# Patient Record
Sex: MALE | Race: WHITE | Employment: OTHER | URBAN - METROPOLITAN AREA
[De-identification: names, ages, dates, MRNs, and addresses within clinical notes are randomized per-mention and may not be internally consistent; named-entity substitution may affect disease eponyms.]

---

## 2022-04-04 ENCOUNTER — OFFICE VISIT (OUTPATIENT)
Dept: PODIATRY | Age: 77
End: 2022-04-04
Payer: MEDICARE

## 2022-04-04 VITALS — BODY MASS INDEX: 25.45 KG/M2 | RESPIRATION RATE: 18 BRPM | WEIGHT: 192 LBS | HEIGHT: 73 IN

## 2022-04-04 DIAGNOSIS — L60.0 INGROWN NAIL: ICD-10-CM

## 2022-04-04 DIAGNOSIS — B35.1 DERMATOPHYTOSIS OF NAIL: Primary | ICD-10-CM

## 2022-04-04 DIAGNOSIS — M79.604 PAIN IN BOTH LOWER EXTREMITIES: ICD-10-CM

## 2022-04-04 DIAGNOSIS — M79.605 PAIN IN BOTH LOWER EXTREMITIES: ICD-10-CM

## 2022-04-04 DIAGNOSIS — E11.51 TYPE II DIABETES MELLITUS WITH PERIPHERAL CIRCULATORY DISORDER (HCC): ICD-10-CM

## 2022-04-04 PROCEDURE — 11721 DEBRIDE NAIL 6 OR MORE: CPT | Performed by: PODIATRIST

## 2022-04-04 PROCEDURE — 99203 OFFICE O/P NEW LOW 30 MIN: CPT | Performed by: PODIATRIST

## 2022-04-04 RX ORDER — LIRAGLUTIDE 6 MG/ML
INJECTION SUBCUTANEOUS
COMMUNITY
Start: 2022-03-25

## 2022-04-04 RX ORDER — GLIMEPIRIDE 4 MG/1
TABLET ORAL
COMMUNITY
Start: 2022-02-02

## 2022-04-04 RX ORDER — SIMVASTATIN 20 MG
TABLET ORAL
COMMUNITY
Start: 2022-03-21

## 2022-04-04 RX ORDER — QUINAPRIL 40 MG/1
TABLET ORAL
COMMUNITY
Start: 2022-03-09

## 2022-04-04 RX ORDER — PEN NEEDLE, DIABETIC 30 GX 1/3"
NEEDLE, DISPOSABLE MISCELLANEOUS
COMMUNITY
Start: 2022-02-25

## 2022-04-04 RX ORDER — METOPROLOL SUCCINATE 50 MG/1
TABLET, EXTENDED RELEASE ORAL
COMMUNITY
Start: 2022-01-26

## 2022-04-04 RX ORDER — HYDROCHLOROTHIAZIDE 50 MG/1
TABLET ORAL
COMMUNITY
Start: 2022-03-01

## 2022-04-04 NOTE — PROGRESS NOTES
[address] [facility] PODIATRY [facility]  [address] [address] [place] [address]  Dept: [phone]  Dept Fax: [phone]    NEW PATIENT PROGRESS NOTE  Date of patient's visit: 4/4/2022  Patient's Name:  Tarik Shetty YOB: 1945            Patient Care Team:  Trell Davenport MD as PCP - General (Internal Medicine)  Nayana Bay DPM as Physician (Podiatry)        Chief Complaint   Patient presents with    New Patient    Diabetes    Peripheral Neuropathy    Nail Problem         HPI:   Tarik Shetty is a 76 y.o. male who presents to the office today complaining of needing a diabetic exam and routine footcare. Patient relates pain is minimal.  Pain is rated 1 out of 10 and is described as none. Treatments prior to today's visit include: none. Currently denies F/C/N/V. Pt's primary care physician is Trell Davenport MD last seen 08/13/2018     No Known Allergies    No past medical history on file. Prior to Admission medications    Medication Sig Start Date End Date Taking?  Authorizing Provider   glimepiride (AMARYL) 4 MG tablet TAKE 2 TABLETS BY MOUTH EVERY DAY 2/2/22  Yes Historical Provider, MD   hydroCHLOROthiazide (HYDRODIURIL) 50 MG tablet TAKE 1 TABLET BY MOUTH EVERY DAY IN THE MORNING 3/1/22  Yes Historical Provider, MD   NOVOTWIST PEN NEEDLE 32G X 5 MM MISC USE AS DIRECTED FOR 90 DAYS 2/25/22  Yes Historical Provider, MD   VICTOZA 18 MG/3ML SOPN SC injection ADMINISTER 1.8 MG UNDER THE SKIN DAILY SUBCUTANEOUSLY 3/25/22  Yes Historical Provider, MD   metFORMIN (GLUCOPHAGE) 1000 MG tablet TAKE 1 TABLET BY MOUTH WITH MEALS TWICE A DAY 3/1/22  Yes Historical Provider, MD   metoprolol succinate (TOPROL XL) 50 MG extended release tablet TAKE 1 TABLET BY MOUTH EVERY DAY 1/26/22  Yes Historical Provider, MD   quinapril (ACCUPRIL) 40 MG tablet TAKE 1 TABLET BY MOUTH EVERY DAY 3/9/22  Yes Historical Provider, MD   simvastatin (ZOCOR) 20 MG tablet TAKE 1 TABLET BY MOUTH EVERY DAY IN THE EVENING 3/21/22  Yes Historical Provider, MD       No past surgical history on file. No family history on file. Social History     Tobacco Use    Smoking status: Not on file    Smokeless tobacco: Not on file   Substance Use Topics    Alcohol use: Not on file       Review of Systems    Review of Systems:   History obtained from chart review and the patient  General ROS: negative for - chills, fatigue, fever, night sweats or weight gain  Constitutional: Negative for chills, diaphoresis, fatigue, fever and unexpected weight change. Musculoskeletal: Positive for arthralgias, gait problem and joint swelling. Neurological ROS: negative for - behavioral changes, confusion, headaches or seizures. Negative for weakness and numbness. Dermatological ROS: negative for - mole changes, rash  Cardiovascular: Negative for leg swelling. Gastrointestinal: Negative for constipation, diarrhea, nausea and vomiting. Lower Extremity Physical Examination:   Vitals:   Vitals:    04/04/22 0905   Resp: 18     General: AAO x 3 in NAD. Dermatologic Exam:    Dermatologic Exam:  Skin lesion/ulceration Absent . Skin No rashes or nodules noted. .   Skin is thin, with flaky sloughing skin as well as decreased hair growth to the lower leg  Small red hemosiderin deposits seen dorsal foot   Musculoskeletal:     1st MPJ ROM decreased, Bilateral.  Muscle strength 5/5, Bilateral.  Pain present upon palpation of toenails 1-5, Bilateral. decreased medial longitudinal arch, Bilateral.  Ankle ROM decreased,Bilateral.    Dorsally contracted digits present digits 2, Bilateral.     Vascular: DP pulses 1/4 bilateral.  PT pulses 0/4 bilateral.   CFT <5 seconds, Bilateral.  Hair growth absent to the level of the digits, Bilateral.  Edema present, Bilateral.  Varicosities absent, Bilateral. Erythema absent, Bilateral    Neurological: Sensation diminshed to light touch to level of digits, Bilateral.  Protective sensation intact 6/10 sites via 5.07/10g [place]-Jony Monofilament, Bilateral.  negative Tinel's, Bilateral.  negative Valleix sign, Bilateral.      Integument: Warm, dry, supple, Bilateral.  Open lesion absent, Bilateral.  Interdigital maceration absent to web spaces 4, Bilateral.  Nails 1-5 left and 1-5 right thickened > 3.0 mm, dystrophic and crumbly, discolored with yellow subungual debris. Fissures absent, Bilateral.       Asessment: Patient is a 76 y.o. male with:    Diagnosis Orders   1. Dermatophytosis of nail  HM DIABETES FOOT EXAM    48714 - [place] DEBRIDEMENT OF NAILS, 6 OR MORE   2. Ingrown nail  HM DIABETES FOOT EXAM    56408 - [place] DEBRIDEMENT OF NAILS, 6 OR MORE   3. Pain in both lower extremities  HM DIABETES FOOT EXAM    69598 - [place] DEBRIDEMENT OF NAILS, 6 OR MORE   4. Type II diabetes mellitus with peripheral circulatory disorder (HCC)  HM DIABETES FOOT EXAM    47059 - [place] DEBRIDEMENT OF NAILS, 6 OR MORE         Plan: Patient examined and evaluated. Current condition and treatment options discussed in detail. Discussed conservative and surgical options with the patient. Nails 1,2,3,4,5 Right and 1,2,3,4,5 Left were debrided and ground smooth with a dremmel. The patient tolerated the procedure well without apparent complications. All labs were reviewed and all imagining including the above findings were reviewed PRIOR to the patients arrival and with the patient today. Previous patient encounter was reviewed. Encounters from the patients other medical providers were reviewed and noted. Time was spent educating the patient and their families/caregivers on proper care of the feet and ankles. All the above diagnosis were addressed at todays visit and all questions were answered. A total of 30 minutes was spent with this patients encounter which included charting after the patients visit    Advised pt to monitor feet daily for open lesion.  Discussed importance of DM footcare. Verbal and written instructions given to patient. Contact office with any questions/problems/concerns. RTC in 3month(s).     4/4/2022    Electronically signed by Kitty Bar DPM on 4/4/2022 at 9:12 AM  4/4/2022

## 2022-06-27 ENCOUNTER — OFFICE VISIT (OUTPATIENT)
Dept: PODIATRY | Age: 77
End: 2022-06-27
Payer: MEDICARE

## 2022-06-27 VITALS — BODY MASS INDEX: 25.45 KG/M2 | WEIGHT: 192 LBS | HEIGHT: 73 IN

## 2022-06-27 DIAGNOSIS — E11.51 TYPE II DIABETES MELLITUS WITH PERIPHERAL CIRCULATORY DISORDER (HCC): ICD-10-CM

## 2022-06-27 DIAGNOSIS — B35.1 DERMATOPHYTOSIS OF NAIL: Primary | ICD-10-CM

## 2022-06-27 DIAGNOSIS — M79.604 PAIN IN BOTH LOWER EXTREMITIES: ICD-10-CM

## 2022-06-27 DIAGNOSIS — L60.0 INGROWN NAIL: ICD-10-CM

## 2022-06-27 DIAGNOSIS — M79.605 PAIN IN BOTH LOWER EXTREMITIES: ICD-10-CM

## 2022-06-27 PROCEDURE — 99999 PR OFFICE/OUTPT VISIT,PROCEDURE ONLY: CPT | Performed by: PODIATRIST

## 2022-06-27 PROCEDURE — 11721 DEBRIDE NAIL 6 OR MORE: CPT | Performed by: PODIATRIST

## 2022-06-27 RX ORDER — INSULIN GLARGINE 100 [IU]/ML
INJECTION, SOLUTION SUBCUTANEOUS NIGHTLY
COMMUNITY

## 2022-06-27 NOTE — PATIENT INSTRUCTIONS
Schedule a Vaccine  When you qualify to receive the vaccine, call the [facility]) COVID-19 Vaccination Hotline to schedule your appointment or to get additional information about the [facility]) locations which are offering the COVID-19 vaccine. To be 94% effective, it's important that you receive two doses of one of the COVID-19 vaccines. -If you are receiving the [employer] vaccine, your second shot will be scheduled as close to 21 days after the first shot as possible. -If you are receiving the Moderna vaccine, your second shot will be scheduled as close to 28 days after the first shot as possible. [facility]) COVID-19 Vaccination Hotline: [phone]    Links to [facility]) website and [facility] website:    [employer]/[email or web address]-monitoring-coronavirus-covid-19/covid-19-vaccine/[place]/[email or web address]    [email or web address]

## 2022-06-27 NOTE — PROGRESS NOTES
[address] [facility] PODIATRY [facility]  [address] [address] [address]  Dept: [phone]  Dept Fax: [phone]    DIABETIC PROGRESS NOTE  Date of patient's visit: 6/27/2022  Patient's Name:  Alondra Li YOB: 1945            Patient Care Team:  John Gaming MD as PCP - General (Internal Medicine)  Anne Arnold DPM as Physician (Podiatry)          Chief Complaint   Patient presents with    Diabetes     diabetic foot care    Peripheral Neuropathy     bilateral foot       Subjective: Alondra Li comes to clinic for Diabetes (diabetic foot care) and Peripheral Neuropathy (bilateral foot)    he is a diabetic and states that needs toenails trimmed today. Pt currently has complaint of thickened, elongated nails that they cannot manage by themselves. Pt's primary care physician is John Gaming MD last seen 6/6/22   Pt's last blood sugar was 118- this morning. No results found for: LABA1C   Complains of numbness in the feet bilat. History reviewed. No pertinent past medical history.     No Known Allergies  Current Outpatient Medications on File Prior to Visit   Medication Sig Dispense Refill    insulin glargine (BASAGLAR KWIKPEN) 100 UNIT/ML injection pen Inject into the skin nightly      glimepiride (AMARYL) 4 MG tablet TAKE 2 TABLETS BY MOUTH EVERY DAY      hydroCHLOROthiazide (HYDRODIURIL) 50 MG tablet TAKE 1 TABLET BY MOUTH EVERY DAY IN THE MORNING      NOVOTWIST PEN NEEDLE 32G X 5 MM MISC USE AS DIRECTED FOR 90 DAYS      VICTOZA 18 MG/3ML SOPN SC injection ADMINISTER 1.8 MG UNDER THE SKIN DAILY SUBCUTANEOUSLY      metFORMIN (GLUCOPHAGE) 1000 MG tablet TAKE 1 TABLET BY MOUTH WITH MEALS TWICE A DAY      metoprolol succinate (TOPROL XL) 50 MG extended release tablet TAKE 1 TABLET BY MOUTH EVERY DAY      quinapril (ACCUPRIL) 40 MG tablet TAKE 1 TABLET BY MOUTH EVERY DAY      simvastatin (ZOCOR) 20 MG tablet TAKE 1 TABLET BY MOUTH EVERY DAY IN THE EVENING       No current facility-administered medications on file prior to visit. Review of Systems    Review of Systems:   History obtained from chart review and the patient  General ROS: negative for - chills, fatigue, fever, night sweats or weight gain  Constitutional: Negative for chills, diaphoresis, fatigue, fever and unexpected weight change. Musculoskeletal: Positive for arthralgias, gait problem and joint swelling. Neurological ROS: negative for - behavioral changes, confusion, headaches or seizures. Negative for weakness and numbness. Dermatological ROS: negative for - mole changes, rash  Cardiovascular: Negative for leg swelling. Gastrointestinal: Negative for constipation, diarrhea, nausea and vomiting. Objective:  Dermatologic Exam:  Skin lesion/ulceration Absent . Skin No rashes or nodules noted. .   Skin is thin, with flaky sloughing skin as well as decreased hair growth to the lower leg  Small red hemosiderin deposits seen dorsal foot   Musculoskeletal:     1st MPJ ROM decreased, Bilateral.  Muscle strength 5/5, Bilateral.  Pain present upon palpation of toenails 1-5, Bilateral. decreased medial longitudinal arch, Bilateral.  Ankle ROM decreased,Bilateral.    Dorsally contracted digits present digits 2, Bilateral.     Vascular: DP pulses 1/4 bilateral.  PT pulses 0/4 bilateral.   CFT <5 seconds, Bilateral.  Hair growth absent to the level of the digits, Bilateral.  Edema present, Bilateral.  Varicosities absent, Bilateral. Erythema absent, Bilateral    Neurological: Sensation diminshed to light touch to level of digits, Bilateral.  Protective sensation intact 6/10 sites via 5.07/10g [place]-Jony Monofilament, Bilateral.  negative Tinel's, Bilateral.  negative Valleix sign, Bilateral.      Integument: Warm, dry, supple, Bilateral.  Open lesion absent, Bilateral.  Interdigital maceration absent to web spaces 4, Bilateral.  Nails 1-5 left and 1-5 right thickened > 3.0 mm, dystrophic and crumbly, discolored with subungual debris. Fissures absent, Bilateral.   General: AAO x 3 in NAD. Derm  Toenail Description  Sites of Onychomycosis Involvement (Check affected area)  [x] [x] [x] [x] [x] [x] [x] [x] [x] [x]  5 4 3 2 1 1 2 3 4 5                          Right                                        Left    Thickness  [x] [x] [x] [x] [x] [x] [x] [x] [x] [x]  5 4 3 2 1 1 2 3 4 5                         Right                                        Left    Dystrophic Changes   [x] [x] [x] [x] [x] [x] [x] [x] [x] [x]  5 4 3 2 1 1 2 3 4 5                         Right                                        Left    Color   [x] [x] [x] [x] [x] [x] [x] [x] [x] [x]  5 4 3 2 1 1 2 3 4 5                          Right                                        Left    Incurvation/Ingrowin   [] [] [] [] [] [] [] [] [] []  5 4 3 2 1 1 2 3 4 5                         Right                                        Left    Inflammation/Pain   [x] [x] [x] [x] [x] [x] [x] [x] [x] [x]  5 4 3 2 1 1 2 3 4 5                         Right                                        Left        Visual inspection:  Deformity: hammertoe deformity faviola feet  amputation: absent  Skin lesions: absent  Edema: right- 2+ pitting edema, left- 2+ pitting edema    Sensory exam:  Monofilament sensation: abnormal - 6/10 via SW 5.07/10g monofilament to the plantar foot bilateral feet    Pulses: abnormal - 1/4 dorsalis pedis pulse and 0/4 Posterior tibial pulse,   Pinprick: Impaired  Proprioception: Impaired  Vibration (128 Hz): Impaired       DM with PVD       [x]Yes    []No      Assessment:  76 y.o. male with:   Diagnosis Orders   1. Dermatophytosis of nail  74102 - [place] DEBRIDEMENT OF NAILS, 6 OR MORE    HM DIABETES FOOT EXAM   2. Ingrown nail  77087 - [place] DEBRIDEMENT OF NAILS, 6 OR MORE    HM DIABETES FOOT EXAM   3.  Pain in both lower extremities  27340 - [place] DEBRIDEMENT OF NAILS, 6 OR MORE  DIABETES FOOT EXAM   4. Type II diabetes mellitus with peripheral circulatory disorder ([facility])  76247 - [place] DEBRIDEMENT OF NAILS, 6 OR MORE     DIABETES FOOT EXAM       Q7   []Yes    []No                Q8   [x]Yes    []No                     Q9   []Yes    []No    Plan:   Pt was evaluated and examined. Patient was given personalized discharge instructions. Nails 1-10 were debrided sharply in length and thickness with a nipper and , without incident. Pt will follow up in 9 weeks or sooner if any problems arise. Diagnosis was discussed with the pt and all of their questions were answered in detail. Proper foot hygiene and care was discussed with the pt. Informed patient on proper diabetic foot care and importance of tight glycemic control. Patient to check feet daily and contact the office with any questions/problems/concerns.    Other comorbidity noted and will be managed by PCP.  6/27/2022    Electronically signed by Amy Gallegos DPM on 6/27/2022 at 9:03 AM  6/27/2022

## 2022-09-28 ENCOUNTER — OFFICE VISIT (OUTPATIENT)
Dept: PODIATRY | Age: 77
End: 2022-09-28
Payer: MEDICARE

## 2022-09-28 VITALS — HEIGHT: 73 IN | WEIGHT: 192 LBS | BODY MASS INDEX: 25.45 KG/M2

## 2022-09-28 DIAGNOSIS — M79.604 PAIN IN BOTH LOWER EXTREMITIES: ICD-10-CM

## 2022-09-28 DIAGNOSIS — B35.1 DERMATOPHYTOSIS OF NAIL: ICD-10-CM

## 2022-09-28 DIAGNOSIS — E11.51 TYPE II DIABETES MELLITUS WITH PERIPHERAL CIRCULATORY DISORDER (HCC): Primary | ICD-10-CM

## 2022-09-28 DIAGNOSIS — M79.605 PAIN IN BOTH LOWER EXTREMITIES: ICD-10-CM

## 2022-09-28 DIAGNOSIS — L60.0 INGROWN NAIL: ICD-10-CM

## 2022-09-28 PROCEDURE — 11721 DEBRIDE NAIL 6 OR MORE: CPT | Performed by: PODIATRIST

## 2022-09-28 PROCEDURE — 99999 PR OFFICE/OUTPT VISIT,PROCEDURE ONLY: CPT | Performed by: PODIATRIST

## 2022-09-28 NOTE — PROGRESS NOTES
[address] [facility] PODIATRY [facility]  [address] [address] [address]  Dept: [phone]  Dept Fax: [phone]    DIABETIC PROGRESS NOTE  Date of patient's visit: 9/28/2022  Patient's Name:  Den Suazo YOB: 1945            Patient Care Team:  Maciel Hernandez MD as PCP - General (Internal Medicine)  Janice Simeon DPM as Physician (Podiatry)          Chief Complaint   Patient presents with    Diabetes     Diabetic foot care    Peripheral Neuropathy     Bilateral feet       Subjective: Den Suaoz comes to clinic for Diabetes (Diabetic foot care) and Peripheral Neuropathy (Bilateral feet)    he is a diabetic and states that he is doing well. Pt currently has complaint of thickened, elongated nails that they cannot manage by themselves. Pt's primary care physician is Maciel Hernandez MD last seen 06/06/2022   Pt's last blood sugar was 150 this morning. No results found for: LABA1C   Complains of numbness in the feet bilat. No past medical history on file.     No Known Allergies  Current Outpatient Medications on File Prior to Visit   Medication Sig Dispense Refill    insulin glargine (BASAGLAR KWIKPEN) 100 UNIT/ML injection pen Inject into the skin nightly      glimepiride (AMARYL) 4 MG tablet TAKE 2 TABLETS BY MOUTH EVERY DAY      hydroCHLOROthiazide (HYDRODIURIL) 50 MG tablet TAKE 1 TABLET BY MOUTH EVERY DAY IN THE MORNING      NOVOTWIST PEN NEEDLE 32G X 5 MM MISC USE AS DIRECTED FOR 90 DAYS      VICTOZA 18 MG/3ML SOPN SC injection ADMINISTER 1.8 MG UNDER THE SKIN DAILY SUBCUTANEOUSLY      metFORMIN (GLUCOPHAGE) 1000 MG tablet TAKE 1 TABLET BY MOUTH WITH MEALS TWICE A DAY      metoprolol succinate (TOPROL XL) 50 MG extended release tablet TAKE 1 TABLET BY MOUTH EVERY DAY      quinapril (ACCUPRIL) 40 MG tablet TAKE 1 TABLET BY MOUTH EVERY DAY      simvastatin (ZOCOR) 20 MG tablet TAKE 1 TABLET BY MOUTH EVERY DAY IN THE EVENING       No current facility-administered medications on file prior to visit. Review of Systems    Review of Systems:   History obtained from chart review and the patient  General ROS: negative for - chills, fatigue, fever, night sweats or weight gain  Constitutional: Negative for chills, diaphoresis, fatigue, fever and unexpected weight change. Musculoskeletal: Positive for arthralgias, gait problem and joint swelling. Neurological ROS: negative for - behavioral changes, confusion, headaches or seizures. Negative for weakness and numbness. Dermatological ROS: negative for - mole changes, rash  Cardiovascular: Negative for leg swelling. Gastrointestinal: Negative for constipation, diarrhea, nausea and vomiting. Objective:  Dermatologic Exam:  Skin lesion/ulceration Absent . Skin No rashes or nodules noted. .   Skin is thin, with flaky sloughing skin as well as decreased hair growth to the lower leg  Small red hemosiderin deposits seen dorsal foot   Musculoskeletal:     1st MPJ ROM decreased, Bilateral.  Muscle strength 5/5, Bilateral.  Pain present upon palpation of toenails 1-5, Bilateral. decreased medial longitudinal arch, Bilateral.  Ankle ROM decreased,Bilateral.    Dorsally contracted digits present digits 2, Bilateral.     Vascular: DP pulses 1/4 bilateral.  PT pulses 0/4 bilateral.   CFT <5 seconds, Bilateral.  Hair growth absent to the level of the digits, Bilateral.  Edema present, Bilateral.  Varicosities absent, Bilateral. Erythema absent, Bilateral    Neurological: Sensation diminshed to light touch to level of digits, Bilateral.  Protective sensation intact 6/10 sites via 5.07/10g [place]-Jony Monofilament, Bilateral.  negative Tinel's, Bilateral.  negative Valleix sign, Bilateral.      Integument: Warm, dry, supple, Bilateral.  Open lesion absent, Bilateral.  Interdigital maceration absent to web spaces 4, Bilateral.  Nails 1-5 left and 1-5 right thickened > 3.0 mm, dystrophic and crumbly, discolored with subungual debris. Fissures absent, Bilateral.   General: AAO x 3 in NAD. Derm  Toenail Description  Sites of Onychomycosis Involvement (Check affected area)  [x] [x] [x] [x] [x] [x] [x] [x] [x] [x]  5 4 3 2 1 1 2 3 4 5                          Right                                        Left    Thickness  [x] [x] [x] [x] [x] [x] [x] [x] [x] [x]  5 4 3 2 1 1 2 3 4 5                         Right                                        Left    Dystrophic Changes   [x] [x] [x] [x] [x] [x] [x] [x] [x] [x]  5 4 3 2 1 1 2 3 4 5                         Right                                        Left    Color   [x] [x] [x] [x] [x] [x] [x] [x] [x] [x]  5 4 3 2 1 1 2 3 4 5                          Right                                        Left    Incurvation/Ingrowin   [] [] [] [] [] [] [] [] [] []  5 4 3 2 1 1 2 3 4 5                         Right                                        Left    Inflammation/Pain   [x] [x] [x] [x] [x] [x] [x] [x] [x] [x]  5 4 3 2 1 1 2 3 4 5                         Right                                        Left        Visual inspection:  Deformity: hammertoe deformity faviola feet  amputation: absent  Skin lesions: absent  Edema: right- 2+ pitting edema, left- 2+ pitting edema    Sensory exam:  Monofilament sensation: abnormal - 6/10 via SW 5.07/10g monofilament to the plantar foot bilateral feet    Pulses: abnormal - 1/4 dorsalis pedis pulse and 0/4 Posterior tibial pulse,   Pinprick: Impaired  Proprioception: Impaired  Vibration (128 Hz): Impaired       DM with PVD       [x]Yes    []No      Assessment:  76 y.o. male with:   Diagnosis Orders   1. Type II diabetes mellitus with peripheral circulatory disorder (HCC)  HM DIABETES FOOT EXAM    31292 - [place] DEBRIDEMENT OF NAILS, 6 OR MORE      2. Dermatophytosis of nail  HM DIABETES FOOT EXAM    35693 - [place] DEBRIDEMENT OF NAILS, 6 OR MORE      3.  Ingrown nail  HM DIABETES FOOT EXAM    56986 - [place]

## 2022-11-30 ENCOUNTER — OFFICE VISIT (OUTPATIENT)
Dept: PODIATRY | Age: 77
End: 2022-11-30

## 2022-11-30 VITALS — WEIGHT: 192 LBS | BODY MASS INDEX: 25.45 KG/M2 | HEIGHT: 73 IN

## 2022-11-30 DIAGNOSIS — M79.604 PAIN IN BOTH LOWER EXTREMITIES: ICD-10-CM

## 2022-11-30 DIAGNOSIS — E11.51 TYPE II DIABETES MELLITUS WITH PERIPHERAL CIRCULATORY DISORDER (HCC): Primary | ICD-10-CM

## 2022-11-30 DIAGNOSIS — B35.1 DERMATOPHYTOSIS OF NAIL: ICD-10-CM

## 2022-11-30 DIAGNOSIS — L60.0 INGROWN NAIL: ICD-10-CM

## 2022-11-30 DIAGNOSIS — M79.605 PAIN IN BOTH LOWER EXTREMITIES: ICD-10-CM

## 2022-11-30 NOTE — PATIENT INSTRUCTIONS
Schedule a Vaccine  When you qualify to receive the vaccine, call the [facility]) COVID-19 Vaccination Hotline to schedule your appointment or to get additional information about the [facility]) locations which are offering the COVID-19 vaccine. To be 94% effective, it's important that you receive two doses of one of the COVID-19 vaccines. -If you are receiving the [employer] vaccine, your second shot will be scheduled as close to 21 days after the first shot as possible. -If you are receiving the Moderna vaccine, your second shot will be scheduled as close to 28 days after the first shot as possible. [facility]) COVID-19 Vaccination Hotline: [phone]    Links to [facility]) website and [facility] website:    [employer]/[facility]-monitoring-coronavirus-covid-19/covid-19-vaccine/[place]/[email or web address]    [email or web address]

## 2022-11-30 NOTE — PROGRESS NOTES
[address]  [address] [facility] [address]  Dept: [phone]  Dept Fax: [phone]    DIABETIC PROGRESS NOTE  Date of patient's visit: 11/30/2022  Patient's Name:  Sanaz Monroy YOB: 1945            Patient Care Team:  Dg Dailey MD as PCP - General (Internal Medicine)  Karen Kimbrough DPM as Physician (Podiatry)          Chief Complaint   Patient presents with    Diabetes     Diabetic foot care    Peripheral Neuropathy     Bilateral foot       Subjective: Sanaz Monroy comes to clinic for Diabetes (Diabetic foot care) and Peripheral Neuropathy (Bilateral foot)    he is a diabetic and states that needs toenails trimmed today. Pt currently has complaint of thickened, elongated nails that they cannot manage by themselves. Pt's primary care physician is Dg Dailey MD last seen 6/6/22   Pt's last blood sugar was patient states did not check today. No results found for: LABA1C   Complains of numbness in the feet bilat. No past medical history on file.     No Known Allergies  Current Outpatient Medications on File Prior to Visit   Medication Sig Dispense Refill    insulin glargine (BASAGLAR KWIKPEN) 100 UNIT/ML injection pen Inject into the skin nightly      glimepiride (AMARYL) 4 MG tablet TAKE 2 TABLETS BY MOUTH EVERY DAY      hydroCHLOROthiazide (HYDRODIURIL) 50 MG tablet TAKE 1 TABLET BY MOUTH EVERY DAY IN THE MORNING      NOVOTWIST PEN NEEDLE 32G X 5 MM MISC USE AS DIRECTED FOR 90 DAYS      VICTOZA 18 MG/3ML SOPN SC injection ADMINISTER 1.8 MG UNDER THE SKIN DAILY SUBCUTANEOUSLY      metFORMIN (GLUCOPHAGE) 1000 MG tablet TAKE 1 TABLET BY MOUTH WITH MEALS TWICE A DAY      metoprolol succinate (TOPROL XL) 50 MG extended release tablet TAKE 1 TABLET BY MOUTH EVERY DAY      quinapril (ACCUPRIL) 40 MG tablet TAKE 1 TABLET BY MOUTH EVERY DAY      simvastatin (ZOCOR) 20 MG tablet TAKE 1 TABLET BY MOUTH EVERY DAY IN THE EVENING       No current facility-administered medications on file prior to visit. Review of Systems    Review of Systems:   History obtained from chart review and the patient  General ROS: negative for - chills, fatigue, fever, night sweats or weight gain  Constitutional: Negative for chills, diaphoresis, fatigue, fever and unexpected weight change. Musculoskeletal: Positive for arthralgias, gait problem and joint swelling. Neurological ROS: negative for - behavioral changes, confusion, headaches or seizures. Negative for weakness and numbness. Dermatological ROS: negative for - mole changes, rash  Cardiovascular: Negative for leg swelling. Gastrointestinal: Negative for constipation, diarrhea, nausea and vomiting. Objective:  Dermatologic Exam:  Skin lesion/ulceration Absent . Skin No rashes or nodules noted. .   Skin is thin, with flaky sloughing skin as well as decreased hair growth to the lower leg  Small red hemosiderin deposits seen dorsal foot   Musculoskeletal:     1st MPJ ROM decreased, Bilateral.  Muscle strength 5/5, Bilateral.  Pain present upon palpation of toenails 1-5, Bilateral. decreased medial longitudinal arch, Bilateral.  Ankle ROM decreased,Bilateral.    Dorsally contracted digits present digits 2, Bilateral.     Vascular: DP pulses 1/4 bilateral.  PT pulses 0/4 bilateral.   CFT <5 seconds, Bilateral.  Hair growth absent to the level of the digits, Bilateral.  Edema present, Bilateral.  Varicosities absent, Bilateral. Erythema absent, Bilateral    Neurological: Sensation diminshed to light touch to level of digits, Bilateral.  Protective sensation intact 6/10 sites via 5.07/10g [place]ony Monofilament, Bilateral.  negative Tinel's, Bilateral.  negative Valleix sign, Bilateral.      Integument: Warm, dry, supple, Bilateral.  Open lesion absent, Bilateral.  Interdigital maceration absent to web spaces 4, Bilateral.  Nails 1-5 left and 1-5 right thickened > 3.0 mm, dystrophic and crumbly, discolored with subungual debris. Fissures absent, Bilateral.   General: AAO x 3 in NAD. Derm  Toenail Description  Sites of Onychomycosis Involvement (Check affected area)  [x] [x] [x] [x] [x] [x] [x] [x] [x] [x]  5 4 3 2 1 1 2 3 4 5                          Right                                        Left    Thickness  [x] [x] [x] [x] [x] [x] [x] [x] [x] [x]  5 4 3 2 1 1 2 3 4 5                         Right                                        Left    Dystrophic Changes   [x] [x] [x] [x] [x] [x] [x] [x] [x] [x]  5 4 3 2 1 1 2 3 4 5                         Right                                        Left    Color   [x] [x] [x] [x] [x] [x] [x] [x] [x] [x]  5 4 3 2 1 1 2 3 4 5                          Right                                        Left    Incurvation/Ingrowin   [] [] [] [] [] [] [] [] [] []  5 4 3 2 1 1 2 3 4 5                         Right                                        Left    Inflammation/Pain   [x] [x] [x] [x] [x] [x] [x] [x] [x] [x]  5 4 3 2 1 1 2 3 4 5                         Right                                        Left        Visual inspection:  Deformity: hammertoe deformity faviola feet  amputation: absent  Skin lesions: absent  Edema: right- 2+ pitting edema, left- 2+ pitting edema    Sensory exam:  Monofilament sensation: abnormal - 6/10 via SW 5.07/10g monofilament to the plantar foot bilateral feet    Pulses: abnormal - 1/4 dorsalis pedis pulse and 0/4 Posterior tibial pulse,   Pinprick: Impaired  Proprioception: Impaired  Vibration (128 Hz): Impaired       DM with PVD       [x]Yes    []No      Assessment:  76 y.o. male with:   Diagnosis Orders   1. Type II diabetes mellitus with peripheral circulatory disorder (HCC)  89529 - AL DEBRIDEMENT OF NAILS, 6 OR MORE    HM DIABETES FOOT EXAM      2. Dermatophytosis of nail  24113 - AL DEBRIDEMENT OF NAILS, 6 OR MORE    HM DIABETES FOOT EXAM      3.  Ingrown nail  71454 - AL DEBRIDEMENT OF NAILS, 6 OR MORE     DIABETES FOOT EXAM      4. Pain in both lower extremities  46899 - NV DEBRIDEMENT OF NAILS, 6 OR MORE     DIABETES FOOT EXAM            Q7   []Yes    []No                Q8   [x]Yes    []No                     Q9   []Yes    []No    Plan:   Pt was evaluated and examined. Patient was given personalized discharge instructions. Nails 1-10 were debrided sharply in length and thickness with a nipper and , without incident. Pt will follow up in 9 weeks or sooner if any problems arise. Diagnosis was discussed with the pt and all of their questions were answered in detail. Proper foot hygiene and care was discussed with the pt. Informed patient on proper diabetic foot care and importance of tight glycemic control. Patient to check feet daily and contact the office with any questions/problems/concerns. Other comorbidity noted and will be managed by PCP.   11/30/2022    Electronically signed by Yovany Nichole DPM on 11/30/2022 at 9:47 AM  11/30/2022

## 2023-03-22 ENCOUNTER — OFFICE VISIT (OUTPATIENT)
Dept: PODIATRY | Age: 78
End: 2023-03-22
Payer: MEDICARE

## 2023-03-22 VITALS — WEIGHT: 192 LBS | BODY MASS INDEX: 25.45 KG/M2 | HEIGHT: 73 IN

## 2023-03-22 DIAGNOSIS — B35.1 DERMATOPHYTOSIS OF NAIL: ICD-10-CM

## 2023-03-22 DIAGNOSIS — E11.51 TYPE II DIABETES MELLITUS WITH PERIPHERAL CIRCULATORY DISORDER (HCC): Primary | ICD-10-CM

## 2023-03-22 DIAGNOSIS — M79.605 PAIN IN BOTH LOWER EXTREMITIES: ICD-10-CM

## 2023-03-22 DIAGNOSIS — M79.604 PAIN IN BOTH LOWER EXTREMITIES: ICD-10-CM

## 2023-03-22 DIAGNOSIS — L60.0 INGROWN NAIL: ICD-10-CM

## 2023-03-22 PROCEDURE — 99999 PR OFFICE/OUTPT VISIT,PROCEDURE ONLY: CPT | Performed by: PODIATRIST

## 2023-03-22 PROCEDURE — 11721 DEBRIDE NAIL 6 OR MORE: CPT | Performed by: PODIATRIST

## 2023-03-22 RX ORDER — DAPAGLIFLOZIN 10 MG/1
TABLET, FILM COATED ORAL
COMMUNITY
Start: 2023-03-20

## 2023-03-22 RX ORDER — DORZOLAMIDE HYDROCHLORIDE AND TIMOLOL MALEATE 20; 5 MG/ML; MG/ML
SOLUTION/ DROPS OPHTHALMIC
COMMUNITY
Start: 2023-01-27

## 2023-03-22 NOTE — PROGRESS NOTES
[address] [facility],[address] PODIATRY [facility]  [address]  [address]  Dept: [phone]  Dept Fax: [phone]    DIABETIC PROGRESS NOTE  Date of patient's visit: 3/22/2023  Patient's Name:  Bladimir Chow YOB: 1945            Patient Care Team:  Jim Looney MD as PCP - General (Internal Medicine)  Mayr Oliver DPM as Physician (Podiatry)          Chief Complaint   Patient presents with    Diabetes     Diabetic foot care    Peripheral Neuropathy     Bilateral feet       Subjective: Bladimir Chow comes to clinic for Diabetes (Diabetic foot care) and Peripheral Neuropathy (Bilateral feet)    he is a diabetic and states that he is doing well. Pt currently has complaint of thickened, elongated nails that they cannot manage by themselves. Pt's primary care physician is Jim Looney MD last seen 02/25/2023   Pt's last blood sugar was 160 . Complains of numbness in the feet bilat. No past medical history on file.     No Known Allergies  Current Outpatient Medications on File Prior to Visit   Medication Sig Dispense Refill    FARXIGA 10 MG tablet       dorzolamide-timolol (COSOPT) 22.3-6.8 MG/ML ophthalmic solution INSTILL 1 DROP INTO LEFT EYE TWICE A DAY      insulin glargine (BASAGLAR KWIKPEN) 100 UNIT/ML injection pen Inject into the skin nightly      glimepiride (AMARYL) 4 MG tablet TAKE 2 TABLETS BY MOUTH EVERY DAY      hydroCHLOROthiazide (HYDRODIURIL) 50 MG tablet TAKE 1 TABLET BY MOUTH EVERY DAY IN THE MORNING      NOVOTWIST PEN NEEDLE 32G X 5 MM MISC USE AS DIRECTED FOR 90 DAYS      VICTOZA 18 MG/3ML SOPN SC injection ADMINISTER 1.8 MG UNDER THE SKIN DAILY SUBCUTANEOUSLY      metFORMIN (GLUCOPHAGE) 1000 MG tablet TAKE 1 TABLET BY MOUTH WITH MEALS TWICE A DAY      metoprolol succinate (TOPROL XL) 50 MG extended release tablet TAKE 1 TABLET BY MOUTH EVERY DAY      quinapril (ACCUPRIL) 40 MG tablet TAKE 1

## 2023-05-24 ENCOUNTER — OFFICE VISIT (OUTPATIENT)
Dept: PODIATRY | Age: 78
End: 2023-05-24
Payer: MEDICARE

## 2023-05-24 VITALS — WEIGHT: 195 LBS | BODY MASS INDEX: 25.84 KG/M2 | HEIGHT: 73 IN

## 2023-05-24 DIAGNOSIS — M79.604 PAIN IN BOTH LOWER EXTREMITIES: ICD-10-CM

## 2023-05-24 DIAGNOSIS — M79.605 PAIN IN BOTH LOWER EXTREMITIES: ICD-10-CM

## 2023-05-24 DIAGNOSIS — E11.51 TYPE II DIABETES MELLITUS WITH PERIPHERAL CIRCULATORY DISORDER (HCC): Primary | ICD-10-CM

## 2023-05-24 DIAGNOSIS — L60.0 INGROWN NAIL: ICD-10-CM

## 2023-05-24 DIAGNOSIS — B35.1 DERMATOPHYTOSIS OF NAIL: ICD-10-CM

## 2023-05-24 PROCEDURE — 11721 DEBRIDE NAIL 6 OR MORE: CPT | Performed by: PODIATRIST

## 2023-05-24 PROCEDURE — 99999 PR OFFICE/OUTPT VISIT,PROCEDURE ONLY: CPT | Performed by: PODIATRIST

## 2023-05-24 RX ORDER — LATANOPROST 50 UG/ML
SOLUTION/ DROPS OPHTHALMIC
COMMUNITY
Start: 2023-05-12

## 2023-05-24 RX ORDER — SILICONE ADHESIVE 1.5" X 3"
1 SHEET (EA) TOPICAL EVERY 4 HOURS PRN
COMMUNITY
Start: 2023-04-11 | End: 2024-04-10

## 2023-05-24 RX ORDER — LATANOPROST 50 UG/ML
1 SOLUTION/ DROPS OPHTHALMIC NIGHTLY
COMMUNITY
Start: 2023-05-12

## 2023-05-24 RX ORDER — BRIMONIDINE TARTRATE 2 MG/ML
SOLUTION/ DROPS OPHTHALMIC
COMMUNITY
Start: 2023-05-22

## 2023-05-24 RX ORDER — LISINOPRIL 40 MG/1
40 TABLET ORAL DAILY
COMMUNITY
Start: 2023-03-14

## 2023-05-30 NOTE — PROGRESS NOTES
[facility],[address] PODIATRY [facility]  [address]  [address]  Dept: [phone]  Dept Fax: [phone]    DIABETIC PROGRESS NOTE  Date of patient's visit: 5/30/2023  Patient's Name:  Junaid James YOB: 1945            Patient Care Team:  Tabby Joseph MD as PCP - General (Internal Medicine)  Mer Campoverde DPM as Physician (Podiatry)          Chief Complaint   Patient presents with    Diabetes     Diabetic foot care   Bs 160    Peripheral Neuropathy     Bl feet        Subjective: Junaid James comes to clinic for Diabetes (Diabetic foot care /Bs 160) and Peripheral Neuropathy (Bl feet )    he is a diabetic and states that he is doing well. Pt currently has complaint of thickened, elongated nails that they cannot manage by themselves. Pt's primary care physician is Tabby Joseph MD last seen 02/25/2023   Pt's last blood sugar was 160 . Complains of numbness in the feet bilat. History reviewed. No pertinent past medical history.     No Known Allergies  Current Outpatient Medications on File Prior to Visit   Medication Sig Dispense Refill    brimonidine (ALPHAGAN) 0.2 % ophthalmic solution       latanoprost (XALATAN) 0.005 % ophthalmic solution ADMINISTER 1 DROP TO BOTH EYES NIGHTLY.      latanoprost (XALATAN) 0.005 % ophthalmic solution Apply 1 drop to eye nightly      lisinopril (PRINIVIL;ZESTRIL) 40 MG tablet Take 1 tablet by mouth daily      sodium chloride (GRAYSON 128) 5 % ophthalmic solution Apply 1 drop to eye every 4 hours as needed      FARXIGA 10 MG tablet       dorzolamide-timolol (COSOPT) 22.3-6.8 MG/ML ophthalmic solution INSTILL 1 DROP INTO LEFT EYE TWICE A DAY      insulin glargine (BASAGLAR KWIKPEN) 100 UNIT/ML injection pen Inject into the skin nightly      glimepiride (AMARYL) 4 MG tablet TAKE 2 TABLETS BY MOUTH EVERY DAY      hydroCHLOROthiazide (HYDRODIURIL) 50 MG tablet TAKE 1 TABLET BY

## 2023-08-02 ENCOUNTER — OFFICE VISIT (OUTPATIENT)
Dept: PODIATRY | Age: 78
End: 2023-08-02
Payer: MEDICARE

## 2023-08-02 VITALS — HEIGHT: 73 IN | BODY MASS INDEX: 25.84 KG/M2 | WEIGHT: 195 LBS

## 2023-08-02 DIAGNOSIS — B35.1 DERMATOPHYTOSIS OF NAIL: ICD-10-CM

## 2023-08-02 DIAGNOSIS — M79.605 PAIN IN BOTH LOWER EXTREMITIES: ICD-10-CM

## 2023-08-02 DIAGNOSIS — E11.51 TYPE II DIABETES MELLITUS WITH PERIPHERAL CIRCULATORY DISORDER (HCC): Primary | ICD-10-CM

## 2023-08-02 DIAGNOSIS — L60.0 INGROWN NAIL: ICD-10-CM

## 2023-08-02 DIAGNOSIS — M79.604 PAIN IN BOTH LOWER EXTREMITIES: ICD-10-CM

## 2023-08-02 PROCEDURE — 99999 PR OFFICE/OUTPT VISIT,PROCEDURE ONLY: CPT | Performed by: PODIATRIST

## 2023-08-02 PROCEDURE — 11721 DEBRIDE NAIL 6 OR MORE: CPT | Performed by: PODIATRIST

## 2023-08-02 NOTE — PROGRESS NOTES
[address] [facility],[address] PODIATRY [address] [place] [address]  Dept: [phone]  Dept Fax: [phone]    DIABETIC PROGRESS NOTE  Date of patient's visit: 8/2/2023  Patient's Name:  Craig Rose YOB: 1945            Patient Care Team:  Basil Leon MD as PCP - General (Internal Medicine)  Karly Hicks DPM as Physician (Podiatry)          Chief Complaint   Patient presents with    Diabetes     Diabetic foot care    Peripheral Neuropathy     Bilateral feet       Subjective: Craig Rose comes to clinic for Diabetes (Diabetic foot care) and Peripheral Neuropathy (Bilateral feet)    he is a diabetic and states that he is doing well. Pt currently has complaint of thickened, elongated nails that they cannot manage by themselves. Pt's primary care physician is Basil Leon MD last seen 02/25/2023   Pt's last blood sugar was 160 yesterday. No results found for: LABA1C   Complains of numbness in the feet bilat. No past medical history on file.     No Known Allergies  Current Outpatient Medications on File Prior to Visit   Medication Sig Dispense Refill    brimonidine (ALPHAGAN) 0.2 % ophthalmic solution       latanoprost (XALATAN) 0.005 % ophthalmic solution ADMINISTER 1 DROP TO BOTH EYES NIGHTLY.      latanoprost (XALATAN) 0.005 % ophthalmic solution Apply 1 drop to eye nightly      lisinopril (PRINIVIL;ZESTRIL) 40 MG tablet Take 1 tablet by mouth daily      sodium chloride (GRAYSON 128) 5 % ophthalmic solution Apply 1 drop to eye every 4 hours as needed      FARXIGA 10 MG tablet       dorzolamide-timolol (COSOPT) 22.3-6.8 MG/ML ophthalmic solution INSTILL 1 DROP INTO LEFT EYE TWICE A DAY      insulin glargine (BASAGLAR KWIKPEN) 100 UNIT/ML injection pen Inject into the skin nightly      glimepiride (AMARYL) 4 MG tablet TAKE 2 TABLETS BY MOUTH EVERY DAY      hydroCHLOROthiazide (HYDRODIURIL) 50 MG tablet TAKE

## 2023-10-02 ENCOUNTER — OFFICE VISIT (OUTPATIENT)
Dept: PODIATRY | Age: 78
End: 2023-10-02
Payer: MEDICARE

## 2023-10-02 VITALS — WEIGHT: 202 LBS | BODY MASS INDEX: 26.77 KG/M2 | HEIGHT: 73 IN

## 2023-10-02 DIAGNOSIS — B35.1 DERMATOPHYTOSIS OF NAIL: ICD-10-CM

## 2023-10-02 DIAGNOSIS — L60.0 INGROWN NAIL: ICD-10-CM

## 2023-10-02 DIAGNOSIS — M79.604 PAIN IN BOTH LOWER EXTREMITIES: ICD-10-CM

## 2023-10-02 DIAGNOSIS — E11.51 TYPE II DIABETES MELLITUS WITH PERIPHERAL CIRCULATORY DISORDER (HCC): Primary | ICD-10-CM

## 2023-10-02 DIAGNOSIS — M79.605 PAIN IN BOTH LOWER EXTREMITIES: ICD-10-CM

## 2023-10-02 PROCEDURE — 99999 PR OFFICE/OUTPT VISIT,PROCEDURE ONLY: CPT | Performed by: PODIATRIST

## 2023-10-02 PROCEDURE — 11721 DEBRIDE NAIL 6 OR MORE: CPT | Performed by: PODIATRIST

## 2023-10-02 NOTE — PROGRESS NOTES
digits present digits 2, Bilateral.     Vascular: DP pulses 1/4 bilateral.  PT pulses 0/4 bilateral.   CFT <5 seconds, Bilateral.  Hair growth absent to the level of the digits, Bilateral.  Edema present, Bilateral.  Varicosities absent, Bilateral. Erythema absent, Bilateral    Neurological: Sensation diminshed to light touch to level of digits, Bilateral.  Protective sensation intact 6/10 sites via 5.07/10g [place]-Jony Monofilament, Bilateral.  negative Tinel's, Bilateral.  negative Valleix sign, Bilateral.      Integument: Warm, dry, supple, Bilateral.  Open lesion absent, Bilateral.  Interdigital maceration absent to web spaces 4, Bilateral.  Nails 1-5 left and 1-5 right thickened > 3.0 mm, dystrophic and crumbly, discolored with subungual debris. Fissures absent, Bilateral.   General: AAO x 3 in NAD.     Derm  Toenail Description  Sites of Onychomycosis Involvement (Check affected area)  [x] [x] [x] [x] [x] [x] [x] [x] [x] [x]  5 4 3 2 1 1 2 3 4 5                          Right                                        Left    Thickness  [x] [x] [x] [x] [x] [x] [x] [x] [x] [x]  5 4 3 2 1 1 2 3 4 5                         Right                                        Left    Dystrophic Changes   [x] [x] [x] [x] [x] [x] [x] [x] [x] [x]  5 4 3 2 1 1 2 3 4 5                         Right                                        Left    Color   [x] [x] [x] [x] [x] [x] [x] [x] [x] [x]  5 4 3 2 1 1 2 3 4 5                          Right                                        Left    Incurvation/Ingrowin   [] [] [] [] [] [] [] [] [] []  5 4 3 2 1 1 2 3 4 5                         Right                                        Left    Inflammation/Pain   [x] [x] [x] [x] [x] [x] [x] [x] [x] [x]  5 4 3 2 1 1 2 3 4 5                         Right                                        Left        Visual inspection:  Deformity: hammertoe deformity faviola feet  amputation: absent  Skin lesions: absent  Edema: right- 2+ pitting

## 2023-12-06 ENCOUNTER — OFFICE VISIT (OUTPATIENT)
Dept: PODIATRY | Age: 78
End: 2023-12-06
Payer: MEDICARE

## 2023-12-06 VITALS — BODY MASS INDEX: 26.77 KG/M2 | HEIGHT: 73 IN | WEIGHT: 202 LBS

## 2023-12-06 DIAGNOSIS — M79.604 PAIN IN BOTH LOWER EXTREMITIES: ICD-10-CM

## 2023-12-06 DIAGNOSIS — E11.51 TYPE II DIABETES MELLITUS WITH PERIPHERAL CIRCULATORY DISORDER (HCC): Primary | ICD-10-CM

## 2023-12-06 DIAGNOSIS — B35.1 DERMATOPHYTOSIS OF NAIL: ICD-10-CM

## 2023-12-06 DIAGNOSIS — M79.605 PAIN IN BOTH LOWER EXTREMITIES: ICD-10-CM

## 2023-12-06 DIAGNOSIS — L60.0 INGROWN NAIL: ICD-10-CM

## 2023-12-06 PROCEDURE — 99999 PR OFFICE/OUTPT VISIT,PROCEDURE ONLY: CPT | Performed by: PODIATRIST

## 2023-12-06 PROCEDURE — 11721 DEBRIDE NAIL 6 OR MORE: CPT | Performed by: PODIATRIST

## 2024-01-11 NOTE — PROGRESS NOTES
[facility] URGENT CARE  Urgent Care Encounter       CHIEF COMPLAINT       Chief Complaint   Patient presents with    Cough    Nasal Congestion    Fever    Neck Pain       Nurses Notes reviewed and I agree except as noted in the HPI.  HISTORY OF PRESENT ILLNESS   Beth Granger is a 33 y.o. female who presents with complaints of cough, congestion, fever, neck pain, and sinus pressure.  Patient reports that symptoms started over a week ago.  Patient reports taking over-the-counter medication without relief.    The history is provided by the patient.       REVIEW OF SYSTEMS     Review of Systems   Constitutional:  Positive for fever.   HENT:  Positive for congestion, sinus pressure and sinus pain.    All other systems reviewed and are negative.      PAST MEDICAL HISTORY   History reviewed. No pertinent past medical history.    SURGICALHISTORY     Patient  has a past surgical history that includes Tonsillectomy.    CURRENT MEDICATIONS       Previous Medications    ESCITALOPRAM (LEXAPRO) 10 MG TABLET    Take 1 tablet by mouth daily    MULTIPLE VITAMINS-MINERALS (THERAPEUTIC MULTIVITAMIN-MINERALS) TABLET    Take 1 tablet by mouth daily.    TESTOSTERONE CYPIONATE (DEPOTESTOTERONE CYPIONATE) 200 MG/ML INJECTION    Inject 0.25 mLs into the muscle once a week. Max Daily Amount: 50 mg       ALLERGIES     Patient is has No Known Allergies.    Patients   Immunization History   Administered Date(s) Administered    COVID-19, J&J, (age 18y+), IM, 0.5 mL 06/19/2021       FAMILY HISTORY     Patient's family history includes Cancer in her mother.    SOCIAL HISTORY     Patient  reports that she has never smoked. She does not have any smokeless tobacco history on file. She reports that she does not drink alcohol and does not use drugs.    PHYSICAL EXAM     ED TRIAGE VITALS  BP: 125/89, Temp: 98.2 °F (36.8 °C), Pulse: 92, Respirations: 18, SpO2: 97 %,Estimated body mass index is 23.68 kg/m² as calculated from the  [address] [facility],[address] PODIATRY [address] [place] [place] [address]  Dept: [phone]  Dept Fax: [phone]    DIABETIC PROGRESS NOTE  Date of patient's visit: 12/6/2023  Patient's Name:  Jodi Armando YOB: 1945            Patient Care Team:  Sandra See MD as PCP - General (Internal Medicine)  Bennie Moody DPM as Physician (Podiatry)          Chief Complaint   Patient presents with    Diabetes     Diabetic foot care    Peripheral Neuropathy     Bilateral feet       Subjective: Jodi Armando comes to clinic for Diabetes (Diabetic foot care) and Peripheral Neuropathy (Bilateral feet)    he is a diabetic and states that he is doing well. Pt currently has complaint of thickened, elongated nails that they cannot manage by themselves. Pt's primary care physician is Sandra See MD last seen 02/25/2023   Pt's      No results found for: \"LABA1C\"   Complains of numbness in the feet bilat. No past medical history on file.     No Known Allergies  Current Outpatient Medications on File Prior to Visit   Medication Sig Dispense Refill    halobetasol (ULTRAVATE) 0.05 % cream APPLY A SMALL AMOUNT TO AFFECTED BODY AREAS TWO TIMES A DAY AS NEEDED FOR 2-4 WEEKS MAX      brimonidine (ALPHAGAN) 0.2 % ophthalmic solution       latanoprost (XALATAN) 0.005 % ophthalmic solution ADMINISTER 1 DROP TO BOTH EYES NIGHTLY.      latanoprost (XALATAN) 0.005 % ophthalmic solution Apply 1 drop to eye nightly      lisinopril (PRINIVIL;ZESTRIL) 40 MG tablet Take 1 tablet by mouth daily      sodium chloride (GRAYSON 128) 5 % ophthalmic solution Apply 1 drop to eye every 4 hours as needed      FARXIGA 10 MG tablet       dorzolamide-timolol (COSOPT) 22.3-6.8 MG/ML ophthalmic solution INSTILL 1 DROP INTO LEFT EYE TWICE A DAY      insulin glargine (BASAGLAR KWIKPEN) 100 UNIT/ML injection pen Inject into the skin nightly      glimepiride (AMARYL) 4

## 2024-02-21 ENCOUNTER — OFFICE VISIT (OUTPATIENT)
Dept: PODIATRY | Age: 79
End: 2024-02-21
Payer: MEDICARE

## 2024-02-21 VITALS — BODY MASS INDEX: 27.83 KG/M2 | WEIGHT: 210 LBS | HEIGHT: 73 IN

## 2024-02-21 DIAGNOSIS — B35.1 DERMATOPHYTOSIS OF NAIL: ICD-10-CM

## 2024-02-21 DIAGNOSIS — M79.604 PAIN IN BOTH LOWER EXTREMITIES: ICD-10-CM

## 2024-02-21 DIAGNOSIS — L60.0 INGROWN NAIL: ICD-10-CM

## 2024-02-21 DIAGNOSIS — M79.605 PAIN IN BOTH LOWER EXTREMITIES: ICD-10-CM

## 2024-02-21 DIAGNOSIS — E11.51 TYPE II DIABETES MELLITUS WITH PERIPHERAL CIRCULATORY DISORDER (HCC): Primary | ICD-10-CM

## 2024-02-21 PROCEDURE — 99999 PR OFFICE/OUTPT VISIT,PROCEDURE ONLY: CPT | Performed by: PODIATRIST

## 2024-02-21 PROCEDURE — 11721 DEBRIDE NAIL 6 OR MORE: CPT | Performed by: PODIATRIST

## 2024-02-21 RX ORDER — SEMAGLUTIDE 0.68 MG/ML
0.5 INJECTION, SOLUTION SUBCUTANEOUS WEEKLY
COMMUNITY
Start: 2023-12-28

## 2024-02-21 NOTE — PROGRESS NOTES
[facility] PODIATRY [address]  [address]  [facility] [address]  Dept: [phone]  Dept Fax: [phone]    DIABETIC PROGRESS NOTE  Date of patient's visit: 2/21/2024  Patient's Name:  Augustin Michele YOB: 1945            Patient Care Team:  Nasima Bernal MD as PCP - General (Internal Medicine)  Ayah Rodriguez DPM as Physician (Podiatry)          Chief Complaint   Patient presents with    Diabetes     Diabetic foot care    this am     Peripheral Neuropathy     Bilateral feet        Subjective:   Augustin Michele comes to clinic for Diabetes (Diabetic foot care / this am ) and Peripheral Neuropathy (Bilateral feet )    he is a diabetic and states that needs toenails trimmed today.  Pt currently has complaint of thickened, elongated nails that they cannot manage by themselves.   Pt's primary care physician is Nasima Bernal MD last seen 2/25/23.   Pt's last blood sugar was 180-today.         No results found for: \"LABA1C\"   Complains of numbness in the feet bilat.  No past medical history on file.    No Known Allergies  Current Outpatient Medications on File Prior to Visit   Medication Sig Dispense Refill    OZEMPIC, 0.25 OR 0.5 MG/DOSE, 2 MG/3ML SOPN Inject 0.5 mg into the skin once a week      halobetasol (ULTRAVATE) 0.05 % cream APPLY A SMALL AMOUNT TO AFFECTED BODY AREAS TWO TIMES A DAY AS NEEDED FOR 2-4 WEEKS MAX      brimonidine (ALPHAGAN) 0.2 % ophthalmic solution       latanoprost (XALATAN) 0.005 % ophthalmic solution ADMINISTER 1 DROP TO BOTH EYES NIGHTLY.      latanoprost (XALATAN) 0.005 % ophthalmic solution Apply 1 drop to eye nightly      lisinopril (PRINIVIL;ZESTRIL) 40 MG tablet Take 1 tablet by mouth daily      sodium chloride (GRAYSON 128) 5 % ophthalmic solution Apply 1 drop to eye every 4 hours as needed      FARXIGA 10 MG tablet       dorzolamide-timolol (COSOPT) 22.3-6.8 MG/ML ophthalmic

## 2024-04-24 ENCOUNTER — OFFICE VISIT (OUTPATIENT)
Dept: PODIATRY | Age: 79
End: 2024-04-24
Payer: MEDICARE

## 2024-04-24 VITALS — HEIGHT: 73 IN | WEIGHT: 210 LBS | BODY MASS INDEX: 27.83 KG/M2

## 2024-04-24 DIAGNOSIS — M79.605 PAIN IN BOTH LOWER EXTREMITIES: ICD-10-CM

## 2024-04-24 DIAGNOSIS — E11.51 TYPE II DIABETES MELLITUS WITH PERIPHERAL CIRCULATORY DISORDER (HCC): Primary | ICD-10-CM

## 2024-04-24 DIAGNOSIS — M79.604 PAIN IN BOTH LOWER EXTREMITIES: ICD-10-CM

## 2024-04-24 DIAGNOSIS — B35.1 DERMATOPHYTOSIS OF NAIL: ICD-10-CM

## 2024-04-24 DIAGNOSIS — L60.0 INGROWN NAIL: ICD-10-CM

## 2024-04-24 PROCEDURE — 11721 DEBRIDE NAIL 6 OR MORE: CPT | Performed by: PODIATRIST

## 2024-04-24 NOTE — PROGRESS NOTES
[facility] PODIATRY [address]  [address]  [facility] [address]  Dept: [phone]  Dept Fax: [phone]    DIABETIC PROGRESS NOTE  Date of patient's visit: 4/24/2024  Patient's Name:  Augustin Michele YOB: 1945            Patient Care Team:  Nasima Bernal MD as PCP - General (Internal Medicine)  Ayah Rodriguez DPM as Physician (Podiatry)          Chief Complaint   Patient presents with    Diabetes     Diabetic foot care    this am     Peripheral Neuropathy     Bilateral feet        Subjective:   Augustin Michele comes to clinic for Diabetes (Diabetic foot care / this am ) and Peripheral Neuropathy (Bilateral feet )    he is a diabetic and states that needs toenails trimmed today.  Pt currently has complaint of thickened, elongated nails that they cannot manage by themselves.   Pt's primary care physician is Nasima Bernal MD last seen 4/16/24.   Pt's last blood sugar was 165-today.       No results found for: \"LABA1C\"   Complains of numbness in the feet bilat.  No past medical history on file.    No Known Allergies  Current Outpatient Medications on File Prior to Visit   Medication Sig Dispense Refill    zoster recombinant adjuvanted vaccine (SHINGRIX) 50 MCG/0.5ML SUSR injection 0.5ml IM now and then 0.5ml again in 2 to 6 months.      OZEMPIC, 0.25 OR 0.5 MG/DOSE, 2 MG/3ML SOPN Inject 0.5 mg into the skin once a week      halobetasol (ULTRAVATE) 0.05 % cream APPLY A SMALL AMOUNT TO AFFECTED BODY AREAS TWO TIMES A DAY AS NEEDED FOR 2-4 WEEKS MAX      brimonidine (ALPHAGAN) 0.2 % ophthalmic solution       latanoprost (XALATAN) 0.005 % ophthalmic solution ADMINISTER 1 DROP TO BOTH EYES NIGHTLY.      latanoprost (XALATAN) 0.005 % ophthalmic solution Apply 1 drop to eye nightly      lisinopril (PRINIVIL;ZESTRIL) 40 MG tablet Take 1 tablet by mouth daily      FARXIGA 10 MG tablet       dorzolamide-timolol

## 2024-06-26 ENCOUNTER — OFFICE VISIT (OUTPATIENT)
Dept: PODIATRY | Age: 79
End: 2024-06-26
Payer: MEDICARE

## 2024-06-26 VITALS — HEIGHT: 73 IN | BODY MASS INDEX: 27.83 KG/M2 | WEIGHT: 210 LBS

## 2024-06-26 DIAGNOSIS — M79.605 PAIN IN BOTH LOWER EXTREMITIES: ICD-10-CM

## 2024-06-26 DIAGNOSIS — B35.1 DERMATOPHYTOSIS OF NAIL: ICD-10-CM

## 2024-06-26 DIAGNOSIS — M79.604 PAIN IN BOTH LOWER EXTREMITIES: ICD-10-CM

## 2024-06-26 DIAGNOSIS — E11.51 TYPE II DIABETES MELLITUS WITH PERIPHERAL CIRCULATORY DISORDER (HCC): Primary | ICD-10-CM

## 2024-06-26 PROCEDURE — 11721 DEBRIDE NAIL 6 OR MORE: CPT | Performed by: PODIATRIST

## 2024-06-26 NOTE — PROGRESS NOTES
[facility], [facility] PODIATRY [address]  [address]  [facility] [address]  Dept: [phone]  Dept Fax: [phone]    DIABETIC PROGRESS NOTE  Date of patient's visit: 6/26/2024  Patient's Name:  Augustin Michele YOB: 1945            Patient Care Team:  Nasima Bernal MD as PCP - General (Internal Medicine)  Ayah Rodriguez DPM as Physician (Podiatry)          Chief Complaint   Patient presents with    Diabetes     Diabetic foot care    Peripheral Neuropathy     Bilateral feet       Subjective:   Augustin Michele comes to clinic for Diabetes (Diabetic foot care) and Peripheral Neuropathy (Bilateral feet)    he is a diabetic and states that he is doing well.  Pt currently has complaint of thickened, elongated nails that they cannot manage by themselves.   Pt's primary care physician is Nasima Bernal MD last seen 06/11/2024.   Pt's last blood sugar was unknown.     No results found for: \"LABA1C\"   Complains of numbness in the feet bilat.  No past medical history on file.    No Known Allergies  Current Outpatient Medications on File Prior to Visit   Medication Sig Dispense Refill    zoster recombinant adjuvanted vaccine (SHINGRIX) 50 MCG/0.5ML SUSR injection 0.5ml IM now and then 0.5ml again in 2 to 6 months.      OZEMPIC, 0.25 OR 0.5 MG/DOSE, 2 MG/3ML SOPN Inject 0.5 mg into the skin once a week      halobetasol (ULTRAVATE) 0.05 % cream APPLY A SMALL AMOUNT TO AFFECTED BODY AREAS TWO TIMES A DAY AS NEEDED FOR 2-4 WEEKS MAX      brimonidine (ALPHAGAN) 0.2 % ophthalmic solution       latanoprost (XALATAN) 0.005 % ophthalmic solution ADMINISTER 1 DROP TO BOTH EYES NIGHTLY.      latanoprost (XALATAN) 0.005 % ophthalmic solution Apply 1 drop to eye nightly      lisinopril (PRINIVIL;ZESTRIL) 40 MG tablet Take 1 tablet by mouth daily      FARXIGA 10 MG tablet       dorzolamide-timolol (COSOPT) 22.3-6.8 MG/ML ophthalmic solution INSTILL 1

## 2024-09-18 ENCOUNTER — OFFICE VISIT (OUTPATIENT)
Dept: PODIATRY | Age: 79
End: 2024-09-18
Payer: MEDICARE

## 2024-09-18 VITALS — HEIGHT: 73 IN | BODY MASS INDEX: 27.83 KG/M2 | WEIGHT: 210 LBS

## 2024-09-18 DIAGNOSIS — M79.604 PAIN IN BOTH LOWER EXTREMITIES: ICD-10-CM

## 2024-09-18 DIAGNOSIS — B35.1 DERMATOPHYTOSIS OF NAIL: ICD-10-CM

## 2024-09-18 DIAGNOSIS — E11.51 TYPE II DIABETES MELLITUS WITH PERIPHERAL CIRCULATORY DISORDER (HCC): Primary | ICD-10-CM

## 2024-09-18 DIAGNOSIS — M79.605 PAIN IN BOTH LOWER EXTREMITIES: ICD-10-CM

## 2024-09-18 PROCEDURE — 11721 DEBRIDE NAIL 6 OR MORE: CPT | Performed by: PODIATRIST

## 2024-11-20 ENCOUNTER — OFFICE VISIT (OUTPATIENT)
Dept: PODIATRY | Age: 79
End: 2024-11-20
Payer: MEDICARE

## 2024-11-20 VITALS — BODY MASS INDEX: 27.3 KG/M2 | WEIGHT: 206 LBS | HEIGHT: 73 IN

## 2024-11-20 DIAGNOSIS — M79.605 PAIN IN BOTH LOWER EXTREMITIES: ICD-10-CM

## 2024-11-20 DIAGNOSIS — M79.604 PAIN IN BOTH LOWER EXTREMITIES: ICD-10-CM

## 2024-11-20 DIAGNOSIS — B35.1 DERMATOPHYTOSIS OF NAIL: ICD-10-CM

## 2024-11-20 DIAGNOSIS — L60.0 INGROWN NAIL: ICD-10-CM

## 2024-11-20 DIAGNOSIS — E11.51 TYPE II DIABETES MELLITUS WITH PERIPHERAL CIRCULATORY DISORDER (HCC): Primary | ICD-10-CM

## 2024-11-20 PROCEDURE — 11721 DEBRIDE NAIL 6 OR MORE: CPT | Performed by: PODIATRIST

## 2024-11-20 RX ORDER — TRIAMCINOLONE ACETONIDE 1 MG/G
CREAM TOPICAL
COMMUNITY
Start: 2024-10-10

## 2024-11-20 NOTE — PROGRESS NOTES
[facility], [facility] PODIATRY [address]  [address]  [facility] [address]  Dept: [phone]  Dept Fax: [phone]    DIABETIC PROGRESS NOTE  Date of patient's visit: 11/20/2024  Patient's Name:  Augustin Michele YOB: 1945            Patient Care Team:  Nasima Bernal MD as PCP - General (Internal Medicine)  Ayah Rodriguez DPM as Physician (Podiatry)          Chief Complaint   Patient presents with    Diabetes     Diabetic foot care   Bs 162 this am     Peripheral Neuropathy     Bilateral feet        Subjective:   Augustin Michele comes to clinic for Diabetes (Diabetic foot care /Bs 162 this am ) and Peripheral Neuropathy (Bilateral feet )    he is a diabetic and states that needs toenails trimmed today.  Pt currently has complaint of thickened, elongated nails that they cannot manage by themselves.   Pt's primary care physician is Nasima Bernal MD last seen 10/29/24   Pt's last blood sugar was 162-today.     Complains of numbness in the feet bilat.  No past medical history on file.    No Known Allergies  Current Outpatient Medications on File Prior to Visit   Medication Sig Dispense Refill    triamcinolone (KENALOG) 0.1 % cream APPLY A SMALL AMOUNT TO AFFECTED ITCHING AREA ON BODY TWICE A DAY AS NEEDED FOR 2-4 WKS MAX      zoster recombinant adjuvanted vaccine (SHINGRIX) 50 MCG/0.5ML SUSR injection 0.5ml IM now and then 0.5ml again in 2 to 6 months.      OZEMPIC, 0.25 OR 0.5 MG/DOSE, 2 MG/3ML SOPN Inject 0.5 mg into the skin once a week      halobetasol (ULTRAVATE) 0.05 % cream APPLY A SMALL AMOUNT TO AFFECTED BODY AREAS TWO TIMES A DAY AS NEEDED FOR 2-4 WEEKS MAX      brimonidine (ALPHAGAN) 0.2 % ophthalmic solution       latanoprost (XALATAN) 0.005 % ophthalmic solution ADMINISTER 1 DROP TO BOTH EYES NIGHTLY.      latanoprost (XALATAN) 0.005 % ophthalmic solution Apply 1 drop to eye nightly      lisinopril (PRINIVIL;ZESTRIL)

## 2025-01-22 ENCOUNTER — OFFICE VISIT (OUTPATIENT)
Dept: PODIATRY | Age: 80
End: 2025-01-22
Payer: MEDICARE

## 2025-01-22 VITALS — WEIGHT: 201 LBS | HEIGHT: 72 IN | BODY MASS INDEX: 27.22 KG/M2

## 2025-01-22 DIAGNOSIS — M79.605 PAIN IN BOTH LOWER EXTREMITIES: ICD-10-CM

## 2025-01-22 DIAGNOSIS — M79.604 PAIN IN BOTH LOWER EXTREMITIES: ICD-10-CM

## 2025-01-22 DIAGNOSIS — E11.51 TYPE II DIABETES MELLITUS WITH PERIPHERAL CIRCULATORY DISORDER (HCC): ICD-10-CM

## 2025-01-22 DIAGNOSIS — B35.1 DERMATOPHYTOSIS OF NAIL: Primary | ICD-10-CM

## 2025-01-22 DIAGNOSIS — L60.0 INGROWN NAIL: ICD-10-CM

## 2025-01-22 PROCEDURE — 11721 DEBRIDE NAIL 6 OR MORE: CPT | Performed by: PODIATRIST

## 2025-01-28 NOTE — PROGRESS NOTES
[facility], [facility] PODIATRY [address]  [address]  [facility] [address]  Dept: [phone]  Dept Fax: [phone]    DIABETIC PROGRESS NOTE  Date of patient's visit: 1/28/2025  Patient's Name:  Augustin Michele YOB: 1945            Patient Care Team:  Nasima Bernal MD as PCP - General (Internal Medicine)  Ayah Rodriguez DPM as Physician (Podiatry)          Chief Complaint   Patient presents with    Diabetes     Diabetic foot care   Bs 170    Peripheral Neuropathy     Bilateral feet        Subjective:   Augustin Michele comes to clinic for Diabetes (Diabetic foot care /Bs 170) and Peripheral Neuropathy (Bilateral feet )    he is a diabetic and states that needs toenails trimmed today.  Pt currently has complaint of thickened, elongated nails that they cannot manage by themselves.   Pt's primary care physician is Nasima Bernal MD last seen 10/29/24   Pt's last blood sugar was 162-today.    No Known Allergies  Current Outpatient Medications on File Prior to Visit   Medication Sig Dispense Refill    triamcinolone (KENALOG) 0.1 % cream APPLY A SMALL AMOUNT TO AFFECTED ITCHING AREA ON BODY TWICE A DAY AS NEEDED FOR 2-4 WKS MAX      zoster recombinant adjuvanted vaccine (SHINGRIX) 50 MCG/0.5ML SUSR injection 0.5ml IM now and then 0.5ml again in 2 to 6 months.      OZEMPIC, 0.25 OR 0.5 MG/DOSE, 2 MG/3ML SOPN Inject 0.5 mg into the skin once a week      halobetasol (ULTRAVATE) 0.05 % cream APPLY A SMALL AMOUNT TO AFFECTED BODY AREAS TWO TIMES A DAY AS NEEDED FOR 2-4 WEEKS MAX      brimonidine (ALPHAGAN) 0.2 % ophthalmic solution       latanoprost (XALATAN) 0.005 % ophthalmic solution ADMINISTER 1 DROP TO BOTH EYES NIGHTLY.      latanoprost (XALATAN) 0.005 % ophthalmic solution Apply 1 drop to eye nightly      lisinopril (PRINIVIL;ZESTRIL) 40 MG tablet Take 1 tablet by mouth daily      FARXIGA 10 MG tablet       dorzolamide-timolol

## 2025-03-24 ENCOUNTER — OFFICE VISIT (OUTPATIENT)
Dept: PODIATRY | Age: 80
End: 2025-03-24
Payer: MEDICARE

## 2025-03-24 VITALS — BODY MASS INDEX: 27.22 KG/M2 | WEIGHT: 201 LBS | HEIGHT: 72 IN

## 2025-03-24 DIAGNOSIS — E11.51 TYPE II DIABETES MELLITUS WITH PERIPHERAL CIRCULATORY DISORDER (HCC): Primary | ICD-10-CM

## 2025-03-24 DIAGNOSIS — B35.1 DERMATOPHYTOSIS OF NAIL: ICD-10-CM

## 2025-03-24 DIAGNOSIS — M79.604 PAIN IN BOTH LOWER EXTREMITIES: ICD-10-CM

## 2025-03-24 DIAGNOSIS — M79.605 PAIN IN BOTH LOWER EXTREMITIES: ICD-10-CM

## 2025-03-24 DIAGNOSIS — L60.0 INGROWN NAIL: ICD-10-CM

## 2025-03-24 PROCEDURE — 11721 DEBRIDE NAIL 6 OR MORE: CPT | Performed by: PODIATRIST

## 2025-03-24 NOTE — PROGRESS NOTES
[facility] PODIATRY [address]  [address]  [facility] [address]  Dept: [phone]  Dept Fax: [phone]    DIABETIC PROGRESS NOTE  Date of patient's visit: 3/24/2025  Patient's Name:  Augustin Michele YOB: 1945            Patient Care Team:  Nasima Bernal MD as PCP - General (Internal Medicine)  Ayah Rodriguez DPM as Physician (Podiatry)          Chief Complaint   Patient presents with    Diabetes     Patient states he did not check blood sugar    Peripheral Neuropathy     Bilateral foot       Subjective:   Augustin Michele comes to clinic for Diabetes (Patient states he did not check blood sugar) and Peripheral Neuropathy (Bilateral foot)    he is a diabetic and states that needs footcare today.  Pt currently has complaint of thickened, elongated nails that they cannot manage by themselves.   Pt's primary care physician is Nasima Bernal MD last seen 1/8/25.   Pt's last blood sugar was patient states did not check today.     No results found for: \"LABA1C\"   Complains of numbness in the feet bilat.  No past medical history on file.    No Known Allergies  Current Outpatient Medications on File Prior to Visit   Medication Sig Dispense Refill    triamcinolone (KENALOG) 0.1 % cream APPLY A SMALL AMOUNT TO AFFECTED ITCHING AREA ON BODY TWICE A DAY AS NEEDED FOR 2-4 WKS MAX      zoster recombinant adjuvanted vaccine (SHINGRIX) 50 MCG/0.5ML SUSR injection 0.5ml IM now and then 0.5ml again in 2 to 6 months.      OZEMPIC, 0.25 OR 0.5 MG/DOSE, 2 MG/3ML SOPN Inject 0.5 mg into the skin once a week      halobetasol (ULTRAVATE) 0.05 % cream APPLY A SMALL AMOUNT TO AFFECTED BODY AREAS TWO TIMES A DAY AS NEEDED FOR 2-4 WEEKS MAX      brimonidine (ALPHAGAN) 0.2 % ophthalmic solution       latanoprost (XALATAN) 0.005 % ophthalmic solution ADMINISTER 1 DROP TO BOTH EYES NIGHTLY.      latanoprost (XALATAN) 0.005 % ophthalmic solution

## 2025-06-23 ENCOUNTER — OFFICE VISIT (OUTPATIENT)
Dept: PODIATRY | Age: 80
End: 2025-06-23
Payer: MEDICARE

## 2025-06-23 VITALS — WEIGHT: 204 LBS | HEIGHT: 73 IN | BODY MASS INDEX: 27.04 KG/M2

## 2025-06-23 DIAGNOSIS — M79.604 PAIN IN BOTH LOWER EXTREMITIES: ICD-10-CM

## 2025-06-23 DIAGNOSIS — E11.51 TYPE II DIABETES MELLITUS WITH PERIPHERAL CIRCULATORY DISORDER (HCC): Primary | ICD-10-CM

## 2025-06-23 DIAGNOSIS — B35.1 DERMATOPHYTOSIS OF NAIL: ICD-10-CM

## 2025-06-23 DIAGNOSIS — M79.605 PAIN IN BOTH LOWER EXTREMITIES: ICD-10-CM

## 2025-06-23 DIAGNOSIS — L60.0 INGROWN NAIL: ICD-10-CM

## 2025-06-23 PROCEDURE — 11721 DEBRIDE NAIL 6 OR MORE: CPT | Performed by: PODIATRIST

## 2025-06-23 NOTE — PROGRESS NOTES
[facility], [facility] PODIATRY [address]  [address]  [facility] [address]  Dept: [phone]  Dept Fax: [phone]    DIABETIC PROGRESS NOTE  Date of patient's visit: 6/23/2025  Patient's Name:  Augustin Michele YOB: 1945            Patient Care Team:  Nasima Bernal MD as PCP - General (Internal Medicine)  Ayah Rodriguez DPM as Physician (Podiatry)          Chief Complaint   Patient presents with    Diabetes     Blood Sugar 140    Peripheral Neuropathy     Bilateral foot       Subjective:   Augustin Michele comes to clinic for Diabetes (Blood Sugar 140) and Peripheral Neuropathy (Bilateral foot)    he is a diabetic and states that needs footcare today.  Pt currently has complaint of thickened, elongated nails that they cannot manage by themselves.   Pt's primary care physician is Nasima Bernal MD last seen 1/8/25.   Pt's last blood sugar was patient states did not check today.     No results found for: \"LABA1C\"   Complains of numbness in the feet bilat.  No past medical history on file.    No Known Allergies  Current Outpatient Medications on File Prior to Visit   Medication Sig Dispense Refill    triamcinolone (KENALOG) 0.1 % cream APPLY A SMALL AMOUNT TO AFFECTED ITCHING AREA ON BODY TWICE A DAY AS NEEDED FOR 2-4 WKS MAX      zoster recombinant adjuvanted vaccine (SHINGRIX) 50 MCG/0.5ML SUSR injection 0.5ml IM now and then 0.5ml again in 2 to 6 months.      OZEMPIC, 0.25 OR 0.5 MG/DOSE, 2 MG/3ML SOPN Inject 0.5 mg into the skin once a week      halobetasol (ULTRAVATE) 0.05 % cream APPLY A SMALL AMOUNT TO AFFECTED BODY AREAS TWO TIMES A DAY AS NEEDED FOR 2-4 WEEKS MAX      brimonidine (ALPHAGAN) 0.2 % ophthalmic solution       latanoprost (XALATAN) 0.005 % ophthalmic solution ADMINISTER 1 DROP TO BOTH EYES NIGHTLY.      latanoprost (XALATAN) 0.005 % ophthalmic solution Apply 1 drop to eye nightly      lisinopril

## 2025-07-30 ENCOUNTER — HOSPITAL ENCOUNTER (OUTPATIENT)
Age: 80
Setting detail: SPECIMEN
Discharge: HOME | End: 2025-07-30

## 2025-07-30 LAB
ALBUMIN SERPL-MCNC: 2.8 G/DL (ref 3.5–5.2)
ALP SERPL-CCNC: 65 U/L (ref 40–129)
ALT SERPL-CCNC: 44 U/L (ref 10–50)
ANION GAP SERPL CALCULATED.3IONS-SCNC: 9 MMOL/L (ref 9–16)
AST SERPL-CCNC: 55 U/L (ref 10–50)
BASOPHILS # BLD: 0.05 K/UL (ref 0–0.2)
BASOPHILS NFR BLD: 1 % (ref 0–2)
BILIRUB SERPL-MCNC: 0.3 MG/DL (ref 0–1.2)
BUN SERPL-MCNC: 21 MG/DL (ref 8–23)
CALCIUM SERPL-MCNC: 8.4 MG/DL (ref 8.8–10.2)
CHLORIDE SERPL-SCNC: 102 MMOL/L (ref 98–107)
CO2 SERPL-SCNC: 25 MMOL/L (ref 20–31)
CREAT SERPL-MCNC: 0.7 MG/DL (ref 0.7–1.2)
EOSINOPHIL # BLD: 0.14 K/UL (ref 0–0.44)
EOSINOPHILS RELATIVE PERCENT: 2 % (ref 1–4)
ERYTHROCYTE [DISTWIDTH] IN BLOOD BY AUTOMATED COUNT: 14.8 % (ref 11.8–14.4)
GFR, ESTIMATED: >90 ML/MIN/1.73M2
GLUCOSE SERPL-MCNC: 121 MG/DL (ref 82–115)
HCT VFR BLD AUTO: 24.3 % (ref 40.7–50.3)
HGB BLD-MCNC: 8 G/DL (ref 13–17)
IMM GRANULOCYTES # BLD AUTO: 0.11 K/UL (ref 0–0.3)
IMM GRANULOCYTES NFR BLD: 2 %
LYMPHOCYTES NFR BLD: 1.92 K/UL (ref 1.1–3.7)
LYMPHOCYTES RELATIVE PERCENT: 29 % (ref 24–43)
MAGNESIUM SERPL-MCNC: 2 MG/DL (ref 1.6–2.4)
MCH RBC QN AUTO: 33.9 PG (ref 25.2–33.5)
MCHC RBC AUTO-ENTMCNC: 32.9 G/DL (ref 28.4–34.8)
MCV RBC AUTO: 103 FL (ref 82.6–102.9)
MONOCYTES NFR BLD: 0.49 K/UL (ref 0.1–1.2)
MONOCYTES NFR BLD: 8 % (ref 3–12)
NEUTROPHILS NFR BLD: 58 % (ref 36–65)
NEUTS SEG NFR BLD: 3.84 K/UL (ref 1.5–8.1)
NRBC BLD-RTO: 0 PER 100 WBC
PHOSPHATE SERPL-MCNC: 3.4 MG/DL (ref 2.5–4.5)
PLATELET # BLD AUTO: 266 K/UL (ref 138–453)
PMV BLD AUTO: 11.4 FL (ref 8.1–13.5)
POTASSIUM SERPL-SCNC: 4.8 MMOL/L (ref 3.7–5.3)
PROT SERPL-MCNC: 5.2 G/DL (ref 6.6–8.7)
RBC # BLD AUTO: 2.36 M/UL (ref 4.21–5.77)
RBC # BLD: ABNORMAL 10*6/UL
RBC # BLD: ABNORMAL 10*6/UL
SODIUM SERPL-SCNC: 136 MMOL/L (ref 136–145)
WBC OTHER # BLD: 6.6 K/UL (ref 3.5–11.3)

## 2025-07-30 PROCEDURE — 80053 COMPREHEN METABOLIC PANEL: CPT

## 2025-07-30 PROCEDURE — 85025 COMPLETE CBC W/AUTO DIFF WBC: CPT

## 2025-07-30 PROCEDURE — 84100 ASSAY OF PHOSPHORUS: CPT

## 2025-07-30 PROCEDURE — 83735 ASSAY OF MAGNESIUM: CPT
